# Patient Record
Sex: MALE | Race: WHITE | NOT HISPANIC OR LATINO | Employment: FULL TIME | URBAN - METROPOLITAN AREA
[De-identification: names, ages, dates, MRNs, and addresses within clinical notes are randomized per-mention and may not be internally consistent; named-entity substitution may affect disease eponyms.]

---

## 2019-06-29 ENCOUNTER — OFFICE VISIT (OUTPATIENT)
Dept: URGENT CARE | Facility: PHYSICIAN GROUP | Age: 64
End: 2019-06-29
Payer: COMMERCIAL

## 2019-06-29 VITALS
OXYGEN SATURATION: 98 % | WEIGHT: 180 LBS | SYSTOLIC BLOOD PRESSURE: 118 MMHG | DIASTOLIC BLOOD PRESSURE: 64 MMHG | TEMPERATURE: 98.1 F | RESPIRATION RATE: 16 BRPM | HEIGHT: 69 IN | HEART RATE: 77 BPM | BODY MASS INDEX: 26.66 KG/M2

## 2019-06-29 DIAGNOSIS — S61.441A PUNCTURE WOUND WITH FOREIGN BODY OF RIGHT HAND, INITIAL ENCOUNTER: ICD-10-CM

## 2019-06-29 PROCEDURE — 90715 TDAP VACCINE 7 YRS/> IM: CPT | Performed by: PHYSICIAN ASSISTANT

## 2019-06-29 PROCEDURE — 10120 INC&RMVL FB SUBQ TISS SMPL: CPT | Performed by: PHYSICIAN ASSISTANT

## 2019-06-29 PROCEDURE — 90471 IMMUNIZATION ADMIN: CPT | Performed by: PHYSICIAN ASSISTANT

## 2019-06-29 ASSESSMENT — ENCOUNTER SYMPTOMS
FEVER: 0
VOMITING: 0
HEADACHES: 0
EYE REDNESS: 0
COUGH: 0
NAUSEA: 0
SORE THROAT: 0
ABDOMINAL PAIN: 0
EYE DISCHARGE: 0
MYALGIAS: 0
SHORTNESS OF BREATH: 0

## 2019-06-29 NOTE — PROGRESS NOTES
Subjective:      Goyo Bahena is a 63 y.o. male who presents with Laceration (fish hook in right hand )        The patient presents to clinic with a fish hook in his right hand. The patient was attempting to remove the hook from the fish, when the fish moved and the hook punctured the patient's right hand. The patient states the dee dee is caught in the skin. The patient denies active bleeding, pain to the right hand, and decreased ROM of hand/digits. No swelling. No bruising. No numbness, tingling, or weakness. No fever. The patient's tetanus is out of date.     Laceration    The incident occurred 1 to 3 hours ago. The laceration is located on the right hand. The laceration mechanism was a metal edge. The patient is experiencing no pain. His tetanus status is out of date.     PMH:  has no past medical history on file.  MEDS:   Current Outpatient Prescriptions:   •  hydrocodone-acetaminophen (NORCO) 5-325 MG TABS per tablet, Take 1-2 Tabs by mouth every 6 hours as needed. (Patient not taking: Reported on 6/29/2019), Disp: 15 Tab, Rfl: 0  •  Ibuprofen (ADVIL) 200 MG CAPS, Take  by mouth., Disp: , Rfl:   •  hydrocodone-acetaminophen (VICODIN) 5-500 MG TABS, Take 1-2 Tabs by mouth every 8 hours as needed. Will cause sedation, avoid driving, operating heavy machinery, and drinking alcohol  (Patient not taking: Reported on 6/29/2019), Disp: 20 Each, Rfl: 0  •  naproxen (NAPROSYN) 500 MG TABS, Take 1 Tab by mouth 2 times a day, with meals. (Patient not taking: Reported on 6/29/2019), Disp: 20 Each, Rfl: 0  •  PERCOCET 5-325 MG TABS, Take 1-2 Tabs by mouth every four hours as needed for Mild Pain. Take one or two tablets every 4(four) hours as needed for pain , Disp: , Rfl:   ALLERGIES: No Known Allergies  SURGHX:   Past Surgical History:   Procedure Laterality Date   • APPENDECTOMY  8/8/2009    Performed by JIL [facility] at [facility]   • UMBILICAL HERNIA REPAIR  8/8/2009    Performed by JIL BOWLING at  "[facility]   • APPENDECTOMY LAPAROSCOPIC  8/8/2009    Performed by JIL BOWLING at [facility]   • MENISCECTOMY  1983    right     SOCHX:  reports that he has never smoked. He has never used smokeless tobacco. He reports that he drinks alcohol. He reports that he does not use drugs.  FH: Family history was reviewed, no pertinent findings to report    Review of Systems   Constitutional: Negative for fever.   HENT: Negative for congestion, ear pain and sore throat.    Eyes: Negative for discharge and redness.   Respiratory: Negative for cough and shortness of breath.    Cardiovascular: Negative for chest pain and leg swelling.   Gastrointestinal: Negative for abdominal pain, nausea and vomiting.   Musculoskeletal: Negative for myalgias.   Skin: Negative for rash.   Neurological: Negative for headaches.   All other systems reviewed and are negative.         Objective:     /64   Pulse 77   Temp 36.7 °C (98.1 °F) (Temporal)   Resp 16   Ht 1.753 m (5' 9\")   Wt 81.6 kg (180 lb)   SpO2 98%   BMI 26.58 kg/m²      Physical Exam   Constitutional: He is oriented to person, place, and time. He appears well-developed and well-nourished. No distress.   HENT:   Head: Normocephalic and atraumatic.   Right Ear: External ear normal.   Left Ear: External ear normal.   Nose: Nose normal.   Eyes: Conjunctivae and EOM are normal.   Neck: Normal range of motion. Neck supple.   Cardiovascular: Normal rate.    Pulmonary/Chest: Effort normal.   Musculoskeletal: Normal range of motion.        Hands:  Right Hand:  Fish hook in the superficial skin of the palm of right hand near the thumb. No active bleeding. No tenderness to palpation. No swelling.   ROM intact - the patient demonstrated full ROM of right hand and digits, no increased pain with flexion/extension of digits against resistance  Neurovascular intact   Neurological: He is alert and oriented to person, place, and time.        "   Progress:  Fish Hook Removal-  Cleaned the area with alcohol prep pad. Injected Lidocaine 2% without Epi around the fish hook. Used an 11 blade sacple to make a small, superficial incision overlying the dee dee of the hook. Removed the hook from the patient's hand. Minimal bleeding. Irrigated the small puncture wound with saline. The patient demonstracted full ROM of the right hand with normal strength. Dressed the wound with gauze and co-band. The patient tolerated the procedure well.     Updated the patient's Tetanus today in clinic.      Assessment/Plan:     1. Puncture wound of right hand without foreign body, initial encounter  The patient's presenting symptoms and physical exam are consistent with a puncture wound to the right hand secondary to a fish hook. The fish hook was removed today in clinic, see procedure above. Updated the patient's Tetanus. Recommend OTC medications and supportive care for symptomatic management. Discussed return precautions with the patient, and he verbalized understanding.      - Tdap =>6yo IM    OTC Tylenol or Motrin for fever/discomfort.  Keep wound clean and dry  Return to clinic or go to the ED if symptoms worsen or fail to improve, or if the patient should develop pain/tenderness to the right hand, swelling, increased redness or warmth, drainage from the wound, secondary signs of infection, fever/chills, and/or any concerning symptoms.    Discussed plan with the patient, and he agrees to the above.

## 2021-03-15 DIAGNOSIS — Z23 NEED FOR VACCINATION: ICD-10-CM

## 2023-01-26 ENCOUNTER — OFFICE VISIT (OUTPATIENT)
Dept: URGENT CARE | Facility: PHYSICIAN GROUP | Age: 68
End: 2023-01-26
Payer: MEDICARE

## 2023-01-26 VITALS
RESPIRATION RATE: 16 BRPM | HEART RATE: 90 BPM | SYSTOLIC BLOOD PRESSURE: 138 MMHG | TEMPERATURE: 97.8 F | BODY MASS INDEX: 27.25 KG/M2 | HEIGHT: 69 IN | DIASTOLIC BLOOD PRESSURE: 82 MMHG | WEIGHT: 184 LBS | OXYGEN SATURATION: 98 %

## 2023-01-26 DIAGNOSIS — S76.211A STRAIN OF RIGHT GROIN: ICD-10-CM

## 2023-01-26 DIAGNOSIS — R10.31 RIGHT INGUINAL PAIN: ICD-10-CM

## 2023-01-26 PROCEDURE — 99203 OFFICE O/P NEW LOW 30 MIN: CPT | Performed by: PHYSICIAN ASSISTANT

## 2023-01-26 RX ORDER — KETOROLAC TROMETHAMINE 30 MG/ML
30 INJECTION, SOLUTION INTRAMUSCULAR; INTRAVENOUS ONCE
Status: COMPLETED | OUTPATIENT
Start: 2023-01-26 | End: 2023-01-26

## 2023-01-26 RX ORDER — METHYLPREDNISOLONE 4 MG/1
TABLET ORAL
Qty: 21 TABLET | Refills: 0 | Status: SHIPPED | OUTPATIENT
Start: 2023-01-26 | End: 2023-09-04

## 2023-01-26 RX ORDER — CYCLOBENZAPRINE HCL 5 MG
5 TABLET ORAL 2 TIMES DAILY PRN
Qty: 14 TABLET | Refills: 0 | Status: SHIPPED | OUTPATIENT
Start: 2023-01-26 | End: 2023-09-04

## 2023-01-26 RX ADMIN — KETOROLAC TROMETHAMINE 30 MG: 30 INJECTION, SOLUTION INTRAMUSCULAR; INTRAVENOUS at 10:58

## 2023-01-26 ASSESSMENT — ENCOUNTER SYMPTOMS
FLANK PAIN: 0
CHILLS: 0
DIARRHEA: 0
CONSTIPATION: 0
FEVER: 0
ABDOMINAL PAIN: 0

## 2023-01-26 NOTE — PROGRESS NOTES
Subjective:   Goyo Bahena is a 67 y.o. male who presents today with   Chief Complaint   Patient presents with    Groin Pain    Back Pain     Pt states groin pain is due to back injury. Pt slipped on ice. L5 injury      Groin Pain  This is a new problem. The current episode started in the past 7 days. The problem occurs constantly. The problem has been gradually worsening. Pertinent negatives include no abdominal pain, chest pain, chills, fever or urinary symptoms. He has tried nothing for the symptoms. The treatment provided no relief.     Patient states he did slip on ice few days ago and is having some groin pain since then.  He states it is worse with ambulating and walking.  He does have history of L5 fracture about 10 years ago and saw a chiropractor and it healed well.  He does have some intermittent sciatic pain but states this feels different.  Patient does state he does a lot of walking and that has seemed to make it worse.  Patient notes no testicular pain or swelling.  No red flag signs reported today including but not limited to no saddle anesthesia, bowel or bladder dysfunction.      PMH:  has no past medical history on file.  MEDS:   Current Outpatient Medications:     methylPREDNISolone (MEDROL DOSEPAK) 4 MG Tablet Therapy Pack, Follow schedule on package instructions., Disp: 21 Tablet, Rfl: 0    cyclobenzaprine (FLEXERIL) 5 mg tablet, Take 1 Tablet by mouth 2 times a day as needed for Moderate Pain or Muscle Spasms., Disp: 14 Tablet, Rfl: 0    Current Facility-Administered Medications:     ketorolac (TORADOL) injection 30 mg, 30 mg, Intramuscular, Once, LUCIA JusticeAValarie-OSWALDO.  ALLERGIES: No Known Allergies  SURGHX:   Past Surgical History:   Procedure Laterality Date    APPENDECTOMY  8/8/2009    Performed by JIL BOWLING at [facility]    UMBILICAL HERNIA REPAIR  8/8/2009    Performed by JIL BOWLING at [facility]    APPENDECTOMY LAPAROSCOPIC  8/8/2009    Performed  "by JIL BOWLING at [facility]    MENISCECTOMY  1983    right     SOCHX:  reports that he has never smoked. He has never used smokeless tobacco. He reports current alcohol use. He reports that he does not use drugs.  FH: Reviewed with patient, not pertinent to this visit.     Review of Systems   Constitutional:  Negative for chills and fever.   Cardiovascular:  Negative for chest pain.   Gastrointestinal:  Negative for abdominal pain, constipation and diarrhea.   Genitourinary:  Negative for dysuria, flank pain, frequency, hematuria and urgency.      Objective:   /82 (BP Location: Left arm, Patient Position: Sitting, BP Cuff Size: Adult)   Pulse 90   Temp 36.6 °C (97.8 °F) (Temporal)   Resp 16   Ht 1.753 m (5' 9\")   Wt 83.5 kg (184 lb)   SpO2 98%   BMI 27.17 kg/m²   Physical Exam  Vitals and nursing note reviewed.   Constitutional:       General: He is not in acute distress.     Appearance: Normal appearance. He is well-developed. He is not ill-appearing or toxic-appearing.   HENT:      Head: Normocephalic and atraumatic.      Right Ear: Hearing normal.      Left Ear: Hearing normal.   Cardiovascular:      Rate and Rhythm: Normal rate.   Pulmonary:      Effort: Pulmonary effort is normal.   Abdominal:      Tenderness: There is no right CVA tenderness or left CVA tenderness.   Musculoskeletal:        Legs:       Comments: Mild tenderness palpation of the right groin area and muscle tightness appreciated to the area.  No midline spinous process tenderness, no step-off deformity appreciated.  Pain with movement of the right lower extremity in the groin with lateral and forward movement.  Patient is using crutches for ambulation today.   Skin:     General: Skin is warm and dry.      Comments: Patient notes no erythema or bruising to the area.   Neurological:      Mental Status: He is alert.      Coordination: Coordination normal.   Psychiatric:         Mood and Affect: Mood normal. "     Assessment/Plan:   Assessment    1. Right inguinal pain  - ketorolac (TORADOL) injection 30 mg    2. Strain of right groin  - methylPREDNISolone (MEDROL DOSEPAK) 4 MG Tablet Therapy Pack; Follow schedule on package instructions.  Dispense: 21 Tablet; Refill: 0  - cyclobenzaprine (FLEXERIL) 5 mg tablet; Take 1 Tablet by mouth 2 times a day as needed for Moderate Pain or Muscle Spasms.  Dispense: 14 Tablet; Refill: 0  Symptoms and presentation consistent with right groin strain/muscle spasm and recommend treating with Toradol shot today in clinic which he tolerated well.  He is fully understanding of possible side effects of medication including potential drowsiness of the muscle relaxer and he will only use it sparingly as prescribed mostly at night not before driving or working.  He will not use any NSAIDs for at least 24 hours after Toradol shot and not start on the steroid unless his symptoms persist over the next 24 to 48 hours.  Offered to run UA but patient states he is not having any urinary symptoms.  Recommend rest and heat to the area  Offered to refer patient to specialist but he declines at this time and will continue monitoring symptoms.    Differential diagnosis, natural history, supportive care, and indications for immediate follow-up discussed.   Patient given instructions and understanding of medications and treatment.    If not improving in 3-5 days, F/U with PCP or return to UC if symptoms worsen.    Patient agreeable to plan.        Please note that this dictation was created using voice recognition software. I have made every reasonable attempt to correct obvious errors, but I expect that there are errors of grammar and possibly content that I did not discover before finalizing the note.    Jose Multani PA-C

## 2023-09-04 ENCOUNTER — OFFICE VISIT (OUTPATIENT)
Dept: URGENT CARE | Facility: PHYSICIAN GROUP | Age: 68
End: 2023-09-04
Payer: MEDICARE

## 2023-09-04 ENCOUNTER — APPOINTMENT (OUTPATIENT)
Dept: RADIOLOGY | Facility: IMAGING CENTER | Age: 68
End: 2023-09-04
Attending: FAMILY MEDICINE
Payer: MEDICARE

## 2023-09-04 ENCOUNTER — HOSPITAL ENCOUNTER (OUTPATIENT)
Facility: MEDICAL CENTER | Age: 68
End: 2023-09-04
Attending: FAMILY MEDICINE
Payer: MEDICARE

## 2023-09-04 VITALS
DIASTOLIC BLOOD PRESSURE: 82 MMHG | OXYGEN SATURATION: 97 % | SYSTOLIC BLOOD PRESSURE: 126 MMHG | TEMPERATURE: 98 F | RESPIRATION RATE: 18 BRPM | WEIGHT: 186 LBS | BODY MASS INDEX: 27.55 KG/M2 | HEART RATE: 93 BPM | HEIGHT: 69 IN

## 2023-09-04 DIAGNOSIS — M25.461 EFFUSION OF RIGHT KNEE: ICD-10-CM

## 2023-09-04 DIAGNOSIS — S89.91XA INJURY OF RIGHT KNEE, INITIAL ENCOUNTER: ICD-10-CM

## 2023-09-04 LAB
APPEARANCE FLD: NORMAL
BODY FLD TYPE: NORMAL
COLOR FLD: YELLOW
CRYSTALS FLD MICRO: NORMAL
GRAM STN SPEC: NORMAL
LYMPHOCYTES NFR FLD: 1 %
MONOS+MACROS NFR FLD MANUAL: 18 %
NEUTROPHILS NFR FLD: 81 %
NUC CELL # FLD: NORMAL CELLS/UL
RBC # FLD: 3000 CELLS/UL
SIGNIFICANT IND 70042: NORMAL
SITE SITE: NORMAL
SOURCE SOURCE: NORMAL

## 2023-09-04 PROCEDURE — 89051 BODY FLUID CELL COUNT: CPT

## 2023-09-04 PROCEDURE — 20610 DRAIN/INJ JOINT/BURSA W/O US: CPT | Mod: RT | Performed by: FAMILY MEDICINE

## 2023-09-04 PROCEDURE — 87070 CULTURE OTHR SPECIMN AEROBIC: CPT

## 2023-09-04 PROCEDURE — 3074F SYST BP LT 130 MM HG: CPT | Performed by: FAMILY MEDICINE

## 2023-09-04 PROCEDURE — 3079F DIAST BP 80-89 MM HG: CPT | Performed by: FAMILY MEDICINE

## 2023-09-04 PROCEDURE — 87205 SMEAR GRAM STAIN: CPT

## 2023-09-04 PROCEDURE — 99214 OFFICE O/P EST MOD 30 MIN: CPT | Mod: 25 | Performed by: FAMILY MEDICINE

## 2023-09-04 PROCEDURE — 89060 EXAM SYNOVIAL FLUID CRYSTALS: CPT

## 2023-09-04 PROCEDURE — 73564 X-RAY EXAM KNEE 4 OR MORE: CPT | Mod: TC,RT | Performed by: FAMILY MEDICINE

## 2023-09-04 RX ORDER — DICLOFENAC SODIUM 75 MG/1
75 TABLET, DELAYED RELEASE ORAL 2 TIMES DAILY
Qty: 14 TABLET | Refills: 0 | Status: SHIPPED | OUTPATIENT
Start: 2023-09-04

## 2023-09-04 ASSESSMENT — ENCOUNTER SYMPTOMS: FEVER: 0

## 2023-09-04 NOTE — PROGRESS NOTES
Subjective:     Goyo Bahena is a 67 y.o. male who presents for Knee Injury (Pt twisted right knee, slipped on rock x8 days )      HPI  Pt presents for evaluation of an acute problem  Pt with a slip and fall about 8 days ago   Twisted his right knee and thinks that is what caused the pain   Pain did not start until the next day   Having swelling in the knee   Swelling has increased the last few days  Has been wearing a knee sleeve  No redness or skin changes in the area  Having difficulties fully extending his knee due to pain  Has been using crutches due to pain    Hx of ACL sprain and arthroscopic surgery on right knee     Review of Systems   Constitutional:  Negative for fever.   Musculoskeletal:  Positive for joint pain.   Skin:  Negative for rash.       PMH:  has no past medical history on file.  MEDS:   Current Outpatient Medications:     methylPREDNISolone (MEDROL DOSEPAK) 4 MG Tablet Therapy Pack, Follow schedule on package instructions. (Patient not taking: Reported on 9/4/2023), Disp: 21 Tablet, Rfl: 0    cyclobenzaprine (FLEXERIL) 5 mg tablet, Take 1 Tablet by mouth 2 times a day as needed for Moderate Pain or Muscle Spasms. (Patient not taking: Reported on 9/4/2023), Disp: 14 Tablet, Rfl: 0  ALLERGIES: No Known Allergies  SURGHX:   Past Surgical History:   Procedure Laterality Date    APPENDECTOMY  8/8/2009    Performed by JIL BOWLING at [facility]    UMBILICAL HERNIA REPAIR  8/8/2009    Performed by JIL BOWLING at [facility]    APPENDECTOMY LAPAROSCOPIC  8/8/2009    Performed by JIL BOWLING at [facility]    MENISCECTOMY  1983    right     SOCHX:  reports that he has never smoked. He has never used smokeless tobacco. He reports current alcohol use. He reports that he does not use drugs.     Objective:   /82 (BP Location: Right arm, Patient Position: Sitting, BP Cuff Size: Adult)   Pulse 93   Temp 36.7 °C (98 °F) (Temporal)   Resp 18   Ht  "1.753 m (5' 9\")   Wt 84.4 kg (186 lb)   SpO2 97%   BMI 27.47 kg/m²     Physical Exam  Constitutional:       General: He is not in acute distress.     Appearance: He is well-developed. He is not diaphoretic.   Pulmonary:      Effort: Pulmonary effort is normal.   Neurological:      Mental Status: He is alert.         Right knee  Appearance - No bruising, erythema, or deformity appreciated  Palpation - +TTP throughout the quads and joint lines, moderate knee effusion palpated   ROM - Extension 10 degrees short of full, flexion to 90  Neuro - Sensation intact   Special testing - No laxity or pain with varus/valgus stress    Right knee aspiration/injection   First, a verbal consent and a verbal time-out were done, explaining the risks and benefits of the procedure. Then the patient was placed in a supine position.  Superior lateral joint line portals were identified. The skin was cleaned with alcohol and a topical freezing spray used to numb skin.  Using a no touch technique, a 25g 1.5 inch needle was used to inject 2% lidocaine without epinephrine subcutaneously and along the needle tract.  Then an 18-gauge 1.5-inch needle was used to aspirate joint fluid.  A total of 50cc of clear/straw colored fluid was aspirated.  The patient tolerated the procedure well and there were no immediate post injection complications. Hemostasis was then achieved with gentle pressure and a Band-Aid was placed over the lesion.  Post injection instructions for range of motion, ice, activity modification, signs of infection, emergency precautions were discussed with the patient.    Assessment/Plan:   Assessment    1. Injury of right knee, initial encounter  - DX-KNEE COMPLETE 4+ RIGHT; Future  - diclofenac DR (VOLTAREN) 75 MG Tablet Delayed Response; Take 1 Tablet by mouth 2 times a day.  Dispense: 14 Tablet; Refill: 0  - Fluid Cell Count; Future  - FLUID CRYSTALS; Future  - FLUID CULTURE W/GRAM STAIN; Future    2. Effusion of right " knee  - DX-KNEE COMPLETE 4+ RIGHT; Future  - diclofenac DR (VOLTAREN) 75 MG Tablet Delayed Response; Take 1 Tablet by mouth 2 times a day.  Dispense: 14 Tablet; Refill: 0  - Fluid Cell Count; Future  - FLUID CRYSTALS; Future  - FLUID CULTURE W/GRAM STAIN; Future    Patient with right knee injury.  Ha moderate knee effusion.  Revieweds x-ray results which show tricompartmental osteoarthritis.  Reviewed the risks and benefits of aspiration versus other treatment approaches.  Patient prefers to have the knee aspirated and tolerated procedure well, as above.  Send fluid for analysis to rule out gout or infection.  Will empirically start diclofenac, keep knee wrapped, ice, and avoid overuse.  Follow-up lab results.

## 2023-09-06 ENCOUNTER — TELEPHONE (OUTPATIENT)
Dept: SPORTS MEDICINE | Facility: CLINIC | Age: 68
End: 2023-09-06
Payer: COMMERCIAL

## 2023-09-06 RX ORDER — PREDNISONE 20 MG/1
TABLET ORAL
Qty: 11 TABLET | Refills: 0 | Status: SHIPPED | OUTPATIENT
Start: 2023-09-06

## 2023-09-06 NOTE — TELEPHONE ENCOUNTER
Called and discussed test results.  Has some evidence of pseudogout.  He is making some improvements.  May use prednisone if not making sufficient progress.

## 2023-09-07 LAB
BACTERIA FLD AEROBE CULT: NORMAL
GRAM STN SPEC: NORMAL
SIGNIFICANT IND 70042: NORMAL
SITE SITE: NORMAL
SOURCE SOURCE: NORMAL